# Patient Record
Sex: MALE | Race: WHITE | URBAN - METROPOLITAN AREA
[De-identification: names, ages, dates, MRNs, and addresses within clinical notes are randomized per-mention and may not be internally consistent; named-entity substitution may affect disease eponyms.]

---

## 2017-12-11 ENCOUNTER — HOSPITAL ENCOUNTER (EMERGENCY)
Facility: CLINIC | Age: 42
Discharge: HOME | End: 2017-12-11
Attending: EMERGENCY MEDICINE | Admitting: EMERGENCY MEDICINE

## 2017-12-11 ENCOUNTER — APPOINTMENT (OUTPATIENT)
Dept: GENERAL RADIOLOGY | Facility: CLINIC | Age: 42
End: 2017-12-11
Attending: EMERGENCY MEDICINE

## 2017-12-11 VITALS
BODY MASS INDEX: 23.14 KG/M2 | HEIGHT: 76 IN | RESPIRATION RATE: 16 BRPM | HEART RATE: 65 BPM | WEIGHT: 190 LBS | DIASTOLIC BLOOD PRESSURE: 90 MMHG | TEMPERATURE: 98.5 F | SYSTOLIC BLOOD PRESSURE: 135 MMHG | OXYGEN SATURATION: 100 %

## 2017-12-11 DIAGNOSIS — R17 ELEVATED BILIRUBIN: ICD-10-CM

## 2017-12-11 DIAGNOSIS — R11.2 NON-INTRACTABLE VOMITING WITH NAUSEA, UNSPECIFIED VOMITING TYPE: ICD-10-CM

## 2017-12-11 LAB
ALBUMIN SERPL-MCNC: 4.3 G/DL (ref 3.4–5)
ALP SERPL-CCNC: 64 U/L (ref 40–150)
ALT SERPL W P-5'-P-CCNC: 23 U/L (ref 0–70)
ANION GAP SERPL CALCULATED.3IONS-SCNC: 9 MMOL/L (ref 3–14)
AST SERPL W P-5'-P-CCNC: 20 U/L (ref 0–45)
BASOPHILS # BLD AUTO: 0 10E9/L (ref 0–0.2)
BASOPHILS NFR BLD AUTO: 0.3 %
BILIRUB SERPL-MCNC: 4.5 MG/DL (ref 0.2–1.3)
BUN SERPL-MCNC: 16 MG/DL (ref 7–30)
CALCIUM SERPL-MCNC: 9 MG/DL (ref 8.5–10.1)
CHLORIDE SERPL-SCNC: 97 MMOL/L (ref 94–109)
CO2 SERPL-SCNC: 30 MMOL/L (ref 20–32)
CREAT SERPL-MCNC: 1.04 MG/DL (ref 0.66–1.25)
DIFFERENTIAL METHOD BLD: NORMAL
EOSINOPHIL # BLD AUTO: 0 10E9/L (ref 0–0.7)
EOSINOPHIL NFR BLD AUTO: 0 %
ERYTHROCYTE [DISTWIDTH] IN BLOOD BY AUTOMATED COUNT: 12.1 % (ref 10–15)
GFR SERPL CREATININE-BSD FRML MDRD: 78 ML/MIN/1.7M2
GLUCOSE SERPL-MCNC: 109 MG/DL (ref 70–99)
HCT VFR BLD AUTO: 42 % (ref 40–53)
HGB BLD-MCNC: 14.8 G/DL (ref 13.3–17.7)
IMM GRANULOCYTES # BLD: 0 10E9/L (ref 0–0.4)
IMM GRANULOCYTES NFR BLD: 0.1 %
LYMPHOCYTES # BLD AUTO: 1 10E9/L (ref 0.8–5.3)
LYMPHOCYTES NFR BLD AUTO: 10.3 %
MCH RBC QN AUTO: 32.5 PG (ref 26.5–33)
MCHC RBC AUTO-ENTMCNC: 35.2 G/DL (ref 31.5–36.5)
MCV RBC AUTO: 92 FL (ref 78–100)
MONOCYTES # BLD AUTO: 0.8 10E9/L (ref 0–1.3)
MONOCYTES NFR BLD AUTO: 8.7 %
NEUTROPHILS # BLD AUTO: 7.8 10E9/L (ref 1.6–8.3)
NEUTROPHILS NFR BLD AUTO: 80.6 %
NRBC # BLD AUTO: 0 10*3/UL
NRBC BLD AUTO-RTO: 0 /100
PLATELET # BLD AUTO: 235 10E9/L (ref 150–450)
POTASSIUM SERPL-SCNC: 3.8 MMOL/L (ref 3.4–5.3)
PROT SERPL-MCNC: 7.9 G/DL (ref 6.8–8.8)
RBC # BLD AUTO: 4.56 10E12/L (ref 4.4–5.9)
SODIUM SERPL-SCNC: 136 MMOL/L (ref 133–144)
WBC # BLD AUTO: 9.6 10E9/L (ref 4–11)

## 2017-12-11 PROCEDURE — 96361 HYDRATE IV INFUSION ADD-ON: CPT

## 2017-12-11 PROCEDURE — 71020 XR CHEST 2 VW: CPT

## 2017-12-11 PROCEDURE — 25000128 H RX IP 250 OP 636: Performed by: EMERGENCY MEDICINE

## 2017-12-11 PROCEDURE — 96374 THER/PROPH/DIAG INJ IV PUSH: CPT

## 2017-12-11 PROCEDURE — 85025 COMPLETE CBC W/AUTO DIFF WBC: CPT | Performed by: EMERGENCY MEDICINE

## 2017-12-11 PROCEDURE — 99285 EMERGENCY DEPT VISIT HI MDM: CPT | Mod: 25

## 2017-12-11 PROCEDURE — 80053 COMPREHEN METABOLIC PANEL: CPT | Performed by: EMERGENCY MEDICINE

## 2017-12-11 RX ORDER — SODIUM CHLORIDE 9 MG/ML
1000 INJECTION, SOLUTION INTRAVENOUS CONTINUOUS
Status: DISCONTINUED | OUTPATIENT
Start: 2017-12-11 | End: 2017-12-11 | Stop reason: HOSPADM

## 2017-12-11 RX ORDER — ONDANSETRON 4 MG/1
4 TABLET, ORALLY DISINTEGRATING ORAL EVERY 8 HOURS PRN
Qty: 10 TABLET | Refills: 0 | Status: SHIPPED | OUTPATIENT
Start: 2017-12-11 | End: 2017-12-14

## 2017-12-11 RX ORDER — ONDANSETRON 2 MG/ML
4 INJECTION INTRAMUSCULAR; INTRAVENOUS ONCE
Status: COMPLETED | OUTPATIENT
Start: 2017-12-11 | End: 2017-12-11

## 2017-12-11 RX ADMIN — ONDANSETRON 4 MG: 2 INJECTION INTRAMUSCULAR; INTRAVENOUS at 11:06

## 2017-12-11 RX ADMIN — SODIUM CHLORIDE 1000 ML: 9 INJECTION, SOLUTION INTRAVENOUS at 11:11

## 2017-12-11 RX ADMIN — SODIUM CHLORIDE 1000 ML: 9 INJECTION, SOLUTION INTRAVENOUS at 09:55

## 2017-12-11 ASSESSMENT — ENCOUNTER SYMPTOMS
VOMITING: 1
DIARRHEA: 0
ABDOMINAL PAIN: 0
NAUSEA: 1
FEVER: 0
DIZZINESS: 1

## 2017-12-11 NOTE — ED AVS SNAPSHOT
Emergency Department    [address] [place] [address]    Phone:  [phone]    Fax:  [phone]                                       Cl Waldron   MRN: [id number]    Department:   Emergency Department   Date of Visit:  12/11/2017           After Visit Summary Signature Page     I have received my discharge instructions, and my questions have been answered. I have discussed any challenges I see with this plan with the nurse or doctor.    ..........................................................................................................................................  Patient/Patient Representative Signature      ..........................................................................................................................................  Patient Representative Print Name and Relationship to Patient    ..................................................               ................................................  Date                                            Time    ..........................................................................................................................................  Reviewed by Signature/Title    ...................................................              ..............................................  Date                                                            Time

## 2017-12-11 NOTE — ED PROVIDER NOTES
"  History     Chief Complaint:  Nausea & Vomiting (dizziness)      HPI   Cl Waldron is a 42 year old male with a stated history of hiatal hernia who presents to the emergency department today for evaluation of nausea and vomiting. The patient complains of nausea and vomiting that began yesterday morning. Symptoms have not improved prompting ED visit. Here, he notes he has had similar symptoms (4-5 episodes) in the past few years secondary to his hiatal hernia however those episodes typically resolved after rest, hydration, and stretching.  Has seen GI for this in past, no therapy or surgery provided.  He is unable to tolerate solid and liquid intake until this morning when he drank water. He also has associated dizziness but denies diarrhea or abdominal pain. No fever.     Allergies:  No Known Drug Allergies     Medications:    The patient is currently on no regular medications.    Past Medical History:    Other acute myocarditis    Past Surgical History:    C NONSPECIFIC PROCEDURE-eye surgery x 3 as infant  TONSILLECTOMY     Family History:    HTN  CAD  Diabetes  Prostate cancer    Social History:  The patient presents alone.   Smoking Status: Never smoker  Smokeless Tobacco: Former user  Alcohol Use: Yes  Marital Status:  Single [1]     Review of Systems   Constitutional: Negative for fever.   Gastrointestinal: Positive for nausea and vomiting. Negative for abdominal pain and diarrhea.   Neurological: Positive for dizziness.   All other systems reviewed and are negative.    Physical Exam   First Vitals:  BP: 95/54  Pulse: 65  Temp: 98.5  F (36.9  C)  Resp: 16  Height: 193 cm (6' 4\")  Weight: 86.2 kg (190 lb)  SpO2: 98 %    Physical Exam  Vitals: reviewed by me  General: Pt seen on [facility], pleasant, cooperative, and alert to conversation  Eyes: Tracking well, clear conj BL  ENT: Dry MM, O/P clear of lesions  Neck: Midline trachea, FROM to neck  Lungs:   No tachypnea, no accessory m use, speaking in " full sentences.  CV: Regular rate with no JVD  Abd: Soft, non tender, no guarding, no rebound. Non distended  Extremities: no peripheral edema or joint effusion  Skin: No rash, normal turgor and temperature  Neuro: Clear speech and no facial droop.  Psych: Not RIS, no e/o AH/VH      Emergency Department Course   Imaging:  Radiology findings were communicated with the patient who voiced understanding of the findings.    Chest XR, 2 Views:  No acute cardiopulmonary abnormality.  Reading per radiology    Laboratory:  Laboratory findings were communicated with the patient who voiced understanding of the findings.    CMP: Glucose: 109(H), Bilirubin total: 4.5(H) w/o WNL (Creatinine: 1.04)    CBC: AWNL. (WBC 9.6, HGB 14.8, )     Interventions:  0955- NS 1000 mL IV   1106- Zofran 4 mg IV  1111- NS 1000 mL IV     Emergency Department Course:  Nursing notes and vitals reviewed.  I performed an exam of the patient as documented above.     IV was inserted and blood was drawn for laboratory testing, results above.    The patient was sent for a XR while in the emergency department, results above.     I discussed the treatment plan with the patient. They expressed understanding of this plan and consented to discharge.    Impression & Plan      Medical Decision Making:  Cl Waldron is a 42 year old male who presents to the emergency department with what appears to be nausea and vomiting, states he has had this several times before, and states his symptoms are secondary to hiatal hernia, he has seen GI for this as well. Patient has soft abdominal here with no RUQ tenderness and negative Simmons's, and has had no pain here in the ED. He does have some hiccups but no vomiting in the ED, after IV fluids and Zofran he states he feels well enough to go home. Patient tolerating PO, ambulatory in the ED, has a soft abdomen on my repeat exam at the time of discharge. He does have elevated bilirubin count but patient states this  "is a problem for him every time he has these \"episodes\" and when he gets dehydrated his bilirubin is elevated, then he follows with his regular doctor for check up and it returns back to normal. Patient was initially mildly dehydrated, but now he does appear to be stable to follow up for his slightly elevated bilirubin in outpatient setting.       Diagnosis:    ICD-10-CM    1. Elevated bilirubin R17    2. Non-intractable vomiting with nausea, unspecified vomiting type R11.2          Disposition:   Discharge     Discharge Medications:  New Prescriptions    ONDANSETRON (ZOFRAN ODT) 4 MG ODT TAB    Take 1 tablet (4 mg) by mouth every 8 hours as needed       Scribe Disclosure:  Maryellen CAZARES, am serving as a scribe at 9:56 AM on 12/11/2017 to document services personally performed by Billy Alamo MD, based on my observations and the provider's statements to me.    12/11/2017    EMERGENCY DEPARTMENT       Billy Alamo MD  12/11/17 [id number]    "

## 2017-12-11 NOTE — ED AVS SNAPSHOT
Emergency Department    [address] [facility] [address]    Phone:  [phone]    Fax:  [phone]                                       Cl Waldron   MRN: [id number]    Department:   Emergency Department   Date of Visit:  12/11/2017           Patient Information     Date Of Birth          1975        Your diagnoses for this visit were:     Elevated bilirubin     Non-intractable vomiting with nausea, unspecified vomiting type        You were seen by Billy Alamo MD.      Follow-up Information     Follow up with Tomas Dudley MD In 2 days.    Specialty:  Family Practice    Why:  For repeat evaluation and symptom check    Contact information:    [address]  [facility] [address] [phone]          Follow up with  Emergency Department.    Specialty:  EMERGENCY MEDICINE    Why:  If symptoms worsen    Contact information:    [address] [facility] [address] [phone]      Discharge References/Attachments     VOMITING (6Y-ADULT) (ENGLISH)      24 Hour Appointment Hotline       To make an appointment at any [facility], call [phone] ([phone]). If you don't have a family doctor or clinic, we will help you find one. [facility] are conveniently located to serve the needs of you and your family.             Review of your medicines      START taking        Dose / Directions Last dose taken    ondansetron 4 MG ODT tab   Commonly known as:  ZOFRAN ODT   Dose:  4 mg   Quantity:  10 tablet        Take 1 tablet (4 mg) by mouth every 8 hours as needed   Refills:  0                Prescriptions were sent or printed at these locations (1 Prescription)                   Other Prescriptions                Printed at Department/Unit printer (1 of 1)         ondansetron (ZOFRAN ODT) 4 MG ODT tab                Procedures and tests performed during your visit     CBC with platelets  differential    Comprehensive metabolic panel    XR Chest 2 Views      Orders Needing Specimen Collection     None      Pending Results     No orders found from 12/9/2017 to 12/12/2017.            Pending Culture Results     No orders found from 12/9/2017 to 12/12/2017.            Pending Results Instructions     If you had any lab results that were not finalized at the time of your Discharge, you can call the ED Lab Result RN at [phone]. You will be contacted by this team for any positive Lab results or changes in treatment. The nurses are available 7 days a week from 10A to 6:30P.  You can leave a message 24 hours per day and they will return your call.        Test Results From Your Hospital Stay        12/11/2017 10:27 AM      Component Results     Component Value Ref Range & Units Status    Sodium 136 133 - 144 mmol/L Final    Potassium 3.8 3.4 - 5.3 mmol/L Final    Chloride 97 94 - 109 mmol/L Final    Carbon Dioxide 30 20 - 32 mmol/L Final    Anion Gap 9 3 - 14 mmol/L Final    Glucose 109 (H) 70 - 99 mg/dL Final    Urea Nitrogen 16 7 - 30 mg/dL Final    Creatinine 1.04 0.66 - 1.25 mg/dL Final    GFR Estimate 78 >60 mL/min/1.7m2 Final    Non  GFR Calc    GFR Estimate If Black >90 >60 mL/min/1.7m2 Final    African American GFR Calc    Calcium 9.0 8.5 - 10.1 mg/dL Final    Bilirubin Total 4.5 (H) 0.2 - 1.3 mg/dL Final    Albumin 4.3 3.4 - 5.0 g/dL Final    Protein Total 7.9 6.8 - 8.8 g/dL Final    Alkaline Phosphatase 64 40 - 150 U/L Final    ALT 23 0 - 70 U/L Final    AST 20 0 - 45 U/L Final         12/11/2017 10:02 AM      Component Results     Component Value Ref Range & Units Status    WBC 9.6 4.0 - 11.0 10e9/L Final    RBC Count 4.56 4.4 - 5.9 10e12/L Final    Hemoglobin 14.8 13.3 - 17.7 g/dL Final    Hematocrit 42.0 40.0 - 53.0 % Final    MCV 92 78 - 100 fl Final    MCH 32.5 26.5 - 33.0 pg Final    MCHC 35.2 31.5 - 36.5 g/dL Final    RDW 12.1 10.0 - 15.0 % Final    Platelet Count 235 150  - 450 10e9/L Final    Diff Method Automated Method  Final    % Neutrophils 80.6 % Final    % Lymphocytes 10.3 % Final    % Monocytes 8.7 % Final    % Eosinophils 0.0 % Final    % Basophils 0.3 % Final    % Immature Granulocytes 0.1 % Final    Nucleated RBCs 0 0 /100 Final    Absolute Neutrophil 7.8 1.6 - 8.3 10e9/L Final    Absolute Lymphocytes 1.0 0.8 - 5.3 10e9/L Final    Absolute Monocytes 0.8 0.0 - 1.3 10e9/L Final    Absolute Eosinophils 0.0 0.0 - 0.7 10e9/L Final    Absolute Basophils 0.0 0.0 - 0.2 10e9/L Final    Abs Immature Granulocytes 0.0 0 - 0.4 10e9/L Final    Absolute Nucleated RBC 0.0  Final         12/11/2017 11:22 AM      Narrative     XR CHEST 2 VW 12/11/2017 10:58 AM    HISTORY: Hiatal hernia. Vomiting.    COMPARISON: None.    FINDINGS: No airspace consolidation, pleural effusion or pneumothorax.  Normal heart size. Small hiatal hernia is evident on the lateral view.        Impression     IMPRESSION: No acute cardiopulmonary abnormality.    ROYCE DOMINGUEZ MD                Clinical Quality Measure: Blood Pressure Screening     Your blood pressure was checked while you were in the emergency department today. The last reading we obtained was  BP: 95/54 . Please read the guidelines below about what these numbers mean and what you should do about them.  If your systolic blood pressure (the top number) is less than 120 and your diastolic blood pressure (the bottom number) is less than 80, then your blood pressure is normal. There is nothing more that you need to do about it.  If your systolic blood pressure (the top number) is 120-139 or your diastolic blood pressure (the bottom number) is 80-89, your blood pressure may be higher than it should be. You should have your blood pressure rechecked within a year by a primary care provider.  If your systolic blood pressure (the top number) is 140 or greater or your diastolic blood pressure (the bottom number) is 90 or greater, you may have high blood  "pressure. High blood pressure is treatable, but if left untreated over time it can put you at risk for heart attack, stroke, or kidney failure. You should have your blood pressure rechecked by a primary care provider within the next 4 weeks.  If your provider in the emergency department today gave you specific instructions to follow-up with your doctor or provider even sooner than that, you should follow that instruction and not wait for up to 4 weeks for your follow-up visit.        Thank you for choosing [facility]       Thank you for choosing [facility] for your care. Our goal is always to provide you with excellent care. Hearing back from our patients is one way we can continue to improve our services. Please take a few minutes to complete the written survey that you may receive in the mail after you visit with us. Thank you!        [employer] Information     "[employer] lets you send messages to your doctor, view your test results, renew your prescriptions, schedule appointments and more. To sign up, go to [email or web address]" . Click on \"Log in\" on the left side of the screen, which will take you to the Welcome page. Then click on \"Sign up Now\" on the right side of the page.     You will be asked to enter the access code listed below, as well as some personal information. Please follow the directions to create your username and password.     Your access code is: [email or web address]  Expires: 3/11/2018 11:46 AM     Your access code will  in 90 days. If you need help or a new code, please call your [facility] clinic or [phone].        Care EveryWhere ID     This is your Care EveryWhere ID. This could be used by other organizations to access your [facility] medical records  [id number]        Equal Access to Services     ALLYSON DE LA CRUZ : arcenio Lopez, angelina turner. So [facility] [phone].    ATENCIÓN: Si habla español, tiene a alaniz " disposición servicios gratuitos de asistencia lingüística. Danny al [phone].    We comply with applicable federal civil rights laws and [place] laws. We do not discriminate on the basis of race, color, national origin, age, disability, sex, sexual orientation, or gender identity.            After Visit Summary       This is your record. Keep this with you and show to your community pharmacist(s) and doctor(s) at your next visit.